# Patient Record
Sex: FEMALE
[De-identification: names, ages, dates, MRNs, and addresses within clinical notes are randomized per-mention and may not be internally consistent; named-entity substitution may affect disease eponyms.]

---

## 2018-03-22 ENCOUNTER — HOSPITAL ENCOUNTER (OUTPATIENT)
Dept: HOSPITAL 5 - OR | Age: 68
Discharge: HOME | End: 2018-03-22
Attending: OPHTHALMOLOGY
Payer: MEDICARE

## 2018-03-22 VITALS — SYSTOLIC BLOOD PRESSURE: 124 MMHG | DIASTOLIC BLOOD PRESSURE: 61 MMHG

## 2018-03-22 DIAGNOSIS — H26.9: Primary | ICD-10-CM

## 2018-03-22 DIAGNOSIS — J45.909: ICD-10-CM

## 2018-03-22 DIAGNOSIS — Z79.899: ICD-10-CM

## 2018-03-22 DIAGNOSIS — Z91.013: ICD-10-CM

## 2018-03-22 DIAGNOSIS — I10: ICD-10-CM

## 2018-03-22 DIAGNOSIS — Z88.0: ICD-10-CM

## 2018-03-22 PROCEDURE — 66984 XCAPSL CTRC RMVL W/O ECP: CPT

## 2018-03-22 PROCEDURE — V2632 POST CHMBR INTRAOCULAR LENS: HCPCS

## 2018-03-22 RX ADMIN — PHENYLEPHRINE HYDROCHLORIDE SCH DROPS: 25 SOLUTION/ DROPS OPHTHALMIC at 08:20

## 2018-03-22 RX ADMIN — PHENYLEPHRINE HYDROCHLORIDE SCH DROPS: 25 SOLUTION/ DROPS OPHTHALMIC at 07:56

## 2018-03-22 RX ADMIN — TROPICAMIDE SCH DROPS: 10 SOLUTION/ DROPS OPHTHALMIC at 08:01

## 2018-03-22 RX ADMIN — MOXIFLOXACIN SCH DROPS: 5 SOLUTION/ DROPS OPHTHALMIC at 08:01

## 2018-03-22 RX ADMIN — PHENYLEPHRINE HYDROCHLORIDE SCH DROPS: 25 SOLUTION/ DROPS OPHTHALMIC at 08:01

## 2018-03-22 RX ADMIN — TROPICAMIDE SCH DROPS: 10 SOLUTION/ DROPS OPHTHALMIC at 08:20

## 2018-03-22 RX ADMIN — TROPICAMIDE SCH DROPS: 10 SOLUTION/ DROPS OPHTHALMIC at 07:56

## 2018-03-22 RX ADMIN — MOXIFLOXACIN SCH DROPS: 5 SOLUTION/ DROPS OPHTHALMIC at 08:20

## 2018-03-22 RX ADMIN — MOXIFLOXACIN SCH DROPS: 5 SOLUTION/ DROPS OPHTHALMIC at 07:56

## 2018-03-22 NOTE — SHORT STAY SUMMARY
Short Stay Documentation


Date of service: 03/22/18





- History


H&P: obtained from office





- Allergies and Medications


Current Medications: 


 Allergies





Penicillins Allergy (Verified 03/21/18 13:16)


 Rash


Sulfa (Sulfonamide Antibiotics) Allergy (Verified 03/21/18 13:16)


 Itching





 Home Medications











 Medication  Instructions  Recorded  Confirmed  Last Taken  Type


 


ALBUTEROL Inhaler [Proair] 2 puff IH QID PRN 03/21/18 03/21/18 03/22/18 05:15 

History


 


Carisoprodol [Soma] 350 mg PO DAILY 03/21/18 03/21/18 03/21/18 History


 


Cholecalciferol (Vitamin D3) 2,000 unit PO QDAY 03/21/18 03/21/18 03/21/18 

History





[Vitamin D3 2,000 unit]     


 


Omeg3/Dha/Epa/Fish Oil/L.casei 1 each PO DAILY 03/21/18 03/21/18 03/21/18 

History





[Restora Capsule]     


 


Pantoprazole [Protonix] 40 mg PO QDAY 03/21/18 03/21/18 03/22/18 05:15 History


 


Verapamil HCl [Verapamil ER] 180 mg PO DAILY 03/21/18 03/21/18 03/22/18 05:15 

History


 


guaiFENesin/DEXTROMETHORPHAN 1 tab PO BID 03/21/18 03/21/18 03/22/18 05:15 

History





[Mucinex DM -30 mg TAB]     








Active Medications





Moxifloxacin HCl (Vigamox)  1 drops OS Q5MIN [facility]


   Stop: 03/22/18 15:00


   Last Admin: 03/22/18 08:20 Dose:  1 drops


Phenylephrine HCl (Ak-Dilate)  1 drops OS Q5MIN [facility]


   Stop: 03/22/18 15:00


   Last Admin: 03/22/18 08:20 Dose:  1 drops


Prednisolone Acetate (Pred Forte 1%)  1 drops OS QID [facility]


Tetracaine HCl (Tetracaine 0.5%)  1 drops OS Q5M PRN


   PRN Reason: Pain


   Stop: 03/22/18 15:00


   Last Admin: 03/22/18 07:55 Dose:  1 drops


Tropicamide (Mydriacyl)  1 drops OS Q5MIN [facility]


   Stop: 03/22/18 15:00


   Last Admin: 03/22/18 08:20 Dose:  1 drops











- Brief post op/procedure progress note


Date of procedure: 03/22/18


Pre-op diagnosis: left cataract


Post-op diagnosis: same


Procedure: 





Phacoemulsification with intraocular lens insertion left eye


Anesthesia: MAC, local


Surgeon: AMANDEEP BARAHONA


Estimated blood loss: none


Specimen disposition: to lab


Condition: stable





- Disposition


Condition at discharge: Good


Disposition: DC-01 TO HOME OR SELFCARE





- Discharge Diagnoses


(1) Cataract


Status: Acute   


Qualifiers: 


   Cataract type: age-related   Age-related cataract type: nuclear   Laterality

: left   Qualified Code(s): H25.12 - Age-related nuclear cataract, left eye   





Short Stay Discharge Plan


Additional Instructions: 


FOLLOW SURGEON INSTRUCTION SHEETS.


Follow up with: 


KIRBY CRUZ JR, MD [Primary Care Provider] - 7 Days


Forms:  [facility]

## 2018-03-22 NOTE — ANESTHESIA CONSULTATION
Anesthesia Consult and Med Hx


Date of service: 03/22/18





- Airway


Anesthetic Teeth Evaluation: Poor (missing teeth in the bottom), Dentures (upper

)


ROM Head & Neck: Adequate


Mental/Hyoid Distance: Adequate


Mallampati Class: Class II


Intubation Access Assessment: Probably Good





- Pre-Operative Health Status


ASA Pre-Surgery Classification: ASA3


Proposed Anesthetic Plan: MAC





- Pulmonary


Hx Asthma: Yes (uses inhaler daily)





- Cardiovascular System


Hx Hypertension: Yes (1998)





- Central Nervous System


Hx Psychiatric Problems: No





- Other Systems


Hx Alcohol Use: No


Hx Substance Use: No


Hx Cancer: No

## 2018-03-22 NOTE — OPERATIVE REPORT
Operative Report


Operative Report: 








   














PATIENT'S NAME:   





DATE OF BIRTH:   





DATE OF SURGERY: 03/22/2018   





PREOPERATIVE DIAGNOSIS:   Cataract left eye





   


POSTOPERATIVE DIAGNOSIS:    Same 





OPERATIVE PROCEDURE:   Phacoemulsification with intraocular lens implantation, 

left eye





SURGEON:   Tish Esteban M.D.





ASSISTANT SURGEON: None   





Lens:  zlboo 18.0 D


 


ANESTHESIA:   Monitored anesthesia care in combination with topical and 

intracameral anesthesia because of the established specific risk of reflux, 

arrhythmias, or anxiety attacks associated with ocular manipulation, as well as 

the difficulty of the ophthalmologist to manage such potentially catastrophic 

events while simultaneously attempting to complete the surgical procedure and 

was deemed necessary for the patient's safety to have an Anesthetist present 

during the procedure whenever possible. An Anesthetist was utilized to regulate 

the intravenous sedation of the patient so the patient was cooperative yet not 

asleep in order for the patient to successfully maintain fixation of the eye on 

the operating light of the microscope.





COMPLICATIONS:   No blood loss.





ALLERGIES:   PCN sulfa





PROGNOSIS:  Excellent





INDICATIONS FOR SURGERY:  The patient is undergoing surgery in the hopes of 

eliminating or improving these visual difficulties.





PROCEDURE:    After arriving at the surgery center, the patient was given 

topical anesthetic and dilating drops, as noted in the record. The patient was 

then taken into the operating room and given more anesthetic drops.  The eyelids

, lashes, and lid margins were scrubbed with Betadine solution, and the patient 

was draped.  The Nurse Anesthetist administered IV sedation and monitored the 

patient during the procedure.





The eye was then fixated with a 0.12, and a stab incision was made in the 

peripheral clear cornea into the anterior chamber. This was made on my left 

side.  Viscoelastic was next used to fill the anterior chamber.  The eye was 

once again fixated with the 0.12 forceps and a keratome was used make an 

incision in clear cornea peripherally on my right hand side temporally.





The capsule forceps were used to open the central anterior capsule and then 

make a continuous round capsulotomy.


Hydrodissection was carried out utilizing a cannula and balanced salt solution 

to delineate the cortical material from the capsule and the nucleus from the 

cortical material.


The phaco tip was introduced into the eye and used to remove the anterior 

cortical material in the area of the capsulotomy.  Then the phaco tip was 

buried into the nucleus, and a chopping instrument was introduced into the eye 

and used to provide countertraction in the nucleus between this instrument and 

the phaco tip fracturing the nucleus.  This procedure was repeated multiple 

times, providing multiple small segments of the lens, and then the phaco tip 

was used to remove each of these segments.


An I/A tip was then used to remove the remaining cortex.  


The anterior chamber was refilled with viscoelastic.  An one-piece, acrylic 

intraocular lens was then placed into an inserting cartridge.  The tip of the 

inserting cartridge was introduced into the keratome incision and into the 

anterior chamber.  The implant was gently advanced through the cartridge and 

into the eye, where it unfolded, and both haptics were placed in the capsular 

bag, where it centered nicely and appeared to be well fixated.  





After placement of the intraocular lens, the I~and~A handpiece was placed back 

into the eye and used to remove the viscoelastic, including viscoelastic that 

was behind the optic of the intraocular lens.  The anterior chamber was then 

filled with balanced salt solution, and hydration of the wound was used to 

cause swelling of the wound and more appropriate watertight closure.  When the 

wound was found to be firm, the patient was asked to comment on how bright the 

light was.  If there was no light perception at all or if the light was 

substantially dimmer than during the rest of the surgery, the amount of fluid 

in the eye was decompressed to lower the intraocular pressure until the patient 

could see the bright light again.  This was done to avoid any damage or 

decreased blood flow to the optic nerve.











MEDICATIONS APPLIED AT END OF SURGERY:  One drop of Pred Forte and Vigamox





The patient was given a shield to wear at night and was instructed not to rub 

or push on the eye.





DISCHARGE SUMMARY:  The patient was released in stable condition.  The patient 

and those with the patient were given a written sheet of postoperative 

instructions and counseling on any abnormal laboratory studies.  The patient is 

to see us tomorrow for follow-up in the office and is to call immediately for 

any difficulties.  











_____________________________          ________________________________


Tish Esteban M.D.                            Date

## 2018-04-12 ENCOUNTER — HOSPITAL ENCOUNTER (OUTPATIENT)
Dept: HOSPITAL 5 - OR | Age: 68
Discharge: HOME | End: 2018-04-12
Attending: OPHTHALMOLOGY
Payer: MEDICARE

## 2018-04-12 VITALS — SYSTOLIC BLOOD PRESSURE: 124 MMHG | DIASTOLIC BLOOD PRESSURE: 72 MMHG

## 2018-04-12 DIAGNOSIS — J45.909: ICD-10-CM

## 2018-04-12 DIAGNOSIS — Z88.0: ICD-10-CM

## 2018-04-12 DIAGNOSIS — Z79.01: ICD-10-CM

## 2018-04-12 DIAGNOSIS — H26.9: Primary | ICD-10-CM

## 2018-04-12 DIAGNOSIS — Z79.899: ICD-10-CM

## 2018-04-12 DIAGNOSIS — I10: ICD-10-CM

## 2018-04-12 DIAGNOSIS — Z88.2: ICD-10-CM

## 2018-04-12 PROCEDURE — 66984 XCAPSL CTRC RMVL W/O ECP: CPT

## 2018-04-12 PROCEDURE — V2632 POST CHMBR INTRAOCULAR LENS: HCPCS

## 2018-04-12 RX ADMIN — PHENYLEPHRINE HYDROCHLORIDE SCH DROPS: 25 SOLUTION/ DROPS OPHTHALMIC at 12:30

## 2018-04-12 RX ADMIN — PHENYLEPHRINE HYDROCHLORIDE SCH DROPS: 25 SOLUTION/ DROPS OPHTHALMIC at 12:20

## 2018-04-12 RX ADMIN — MOXIFLOXACIN SCH DROPS: 5 SOLUTION/ DROPS OPHTHALMIC at 12:20

## 2018-04-12 RX ADMIN — PHENYLEPHRINE HYDROCHLORIDE SCH DROPS: 25 SOLUTION/ DROPS OPHTHALMIC at 12:25

## 2018-04-12 RX ADMIN — TROPICAMIDE SCH DROPS: 10 SOLUTION/ DROPS OPHTHALMIC at 12:25

## 2018-04-12 RX ADMIN — TROPICAMIDE SCH DROPS: 10 SOLUTION/ DROPS OPHTHALMIC at 12:30

## 2018-04-12 RX ADMIN — TROPICAMIDE SCH DROPS: 10 SOLUTION/ DROPS OPHTHALMIC at 12:20

## 2018-04-12 RX ADMIN — MOXIFLOXACIN SCH DROPS: 5 SOLUTION/ DROPS OPHTHALMIC at 12:30

## 2018-04-12 RX ADMIN — MOXIFLOXACIN SCH DROPS: 5 SOLUTION/ DROPS OPHTHALMIC at 12:25

## 2018-04-12 NOTE — OPERATIVE REPORT
Operative Report


Operative Report: 








   














PATIENT'S NAME:   





DATE OF BIRTH:   





DATE OF SURGERY: 04/12/2018   





PREOPERATIVE DIAGNOSIS:   Cataract right eye





   


POSTOPERATIVE DIAGNOSIS:    Same 





OPERATIVE PROCEDURE:   Phacoemulsification with intraocular lens implantation, 

right eye





SURGEON:   Tish Esteban M.D.





ASSISTANT SURGEON: None   





Lens:  ZLBOO 18.0 D


 


ANESTHESIA:   Monitored anesthesia care in combination with topical and 

intracameral anesthesia because of the established specific risk of reflux, 

arrhythmias, or anxiety attacks associated with ocular manipulation, as well as 

the difficulty of the ophthalmologist to manage such potentially catastrophic 

events while simultaneously attempting to complete the surgical procedure and 

was deemed necessary for the patient's safety to have an Anesthetist present 

during the procedure whenever possible. An Anesthetist was utilized to regulate 

the intravenous sedation of the patient so the patient was cooperative yet not 

asleep in order for the patient to successfully maintain fixation of the eye on 

the operating light of the microscope.





COMPLICATIONS:   o surgical complications No blood loss.





ALLERGIES:   Penicillin sulfa





PROGNOSIS:  Excellent





INDICATIONS FOR SURGERY:  The patient is undergoing surgery in the hopes of 

eliminating or improving these visual difficulties.





PROCEDURE:    After arriving at the surgery center, the patient was given 

topical anesthetic and dilating drops, as noted in the record. The patient was 

then taken into the operating room and given more anesthetic drops.  The eyelids

, lashes, and lid margins were scrubbed with Betadine solution, and the patient 

was draped.  The Nurse Anesthetist administered IV sedation and monitored the 

patient during the procedure.





The eye was then fixated with a 0.12, and a stab incision was made in the 

peripheral clear cornea into the anterior chamber. This was made on my left 

side.  Viscoelastic was next used to fill the anterior chamber.  The eye was 

once again fixated with the 0.12 forceps and a keratome was used make an 

incision in clear cornea peripherally on my right hand side temporally.





The capsule forceps were used to open the central anterior capsule and then 

make a continuous round capsulotomy.


Hydrodissection was carried out utilizing a cannula and balanced salt solution 

to delineate the cortical material from the capsule and the nucleus from the 

cortical material.


The phaco tip was introduced into the eye and used to remove the anterior 

cortical material in the area of the capsulotomy.  Then the phaco tip was 

buried into the nucleus, and a chopping instrument was introduced into the eye 

and used to provide countertraction in the nucleus between this instrument and 

the phaco tip fracturing the nucleus.  This procedure was repeated multiple 

times, providing multiple small segments of the lens, and then the phaco tip 

was used to remove each of these segments.


An I/A tip was then used to remove the remaining cortex.  


The anterior chamber was refilled with viscoelastic.  An one-piece, acrylic 

intraocular lens was then placed into an inserting cartridge.  The tip of the 

inserting cartridge was introduced into the keratome incision and into the 

anterior chamber.  The implant was gently advanced through the cartridge and 

into the eye, where it unfolded, and both haptics were placed in the capsular 

bag, where it centered nicely and appeared to be well fixated.  





After placement of the intraocular lens, the I~and~A handpiece was placed back 

into the eye and used to remove the viscoelastic, including viscoelastic that 

was behind the optic of the intraocular lens.  The anterior chamber was then 

filled with balanced salt solution, and hydration of the wound was used to 

cause swelling of the wound and more appropriate watertight closure.  When the 

wound was found to be firm, the patient was asked to comment on how bright the 

light was.  If there was no light perception at all or if the light was 

substantially dimmer than during the rest of the surgery, the amount of fluid 

in the eye was decompressed to lower the intraocular pressure until the patient 

could see the bright light again.  This was done to avoid any damage or 

decreased blood flow to the optic nerve.











MEDICATIONS APPLIED AT END OF SURGERY:  One drop of Pred Forte and Vigamox





The patient was given a shield to wear at night and was instructed not to rub 

or push on the eye.





DISCHARGE SUMMARY:  The patient was released in stable condition.  The patient 

and those with the patient were given a written sheet of postoperative 

instructions and counseling on any abnormal laboratory studies.  The patient is 

to see us tomorrow for follow-up in the office and is to call immediately for 

any difficulties.  











_____________________________          ________________________________


Tish Esteban M.D.                            Date

## 2018-04-12 NOTE — SHORT STAY SUMMARY
Short Stay Documentation


Date of service: 04/12/18





- History


H&P: obtained from office





- Allergies and Medications


Current Medications: 


 Allergies





Penicillins Allergy (Verified 03/30/18 10:18)


 Rash


Sulfa (Sulfonamide Antibiotics) Allergy (Verified 03/30/18 10:18)


 Itching





 Home Medications











 Medication  Instructions  Recorded  Confirmed  Last Taken  Type


 


ALBUTEROL Inhaler [Proair] 2 puff IH QID PRN 03/21/18 04/12/18 04/12/18 07:00 

History


 


Carisoprodol [Soma] 350 mg PO DAILY 03/21/18 04/12/18 04/11/18 History


 


Cholecalciferol (Vitamin D3) 2,000 unit PO QDAY 03/21/18 04/12/18 04/11/18 

History





[Vitamin D3 2,000 unit]     


 


Omeg3/Dha/Epa/Fish Oil/L.casei 1 each PO DAILY 03/21/18 04/12/18 04/11/18 

History





[Restora Capsule]     


 


Pantoprazole [Protonix] 40 mg PO QDAY 03/21/18 04/12/18 04/12/18 07:00 History


 


Verapamil HCl [Verapamil ER] 180 mg PO DAILY 03/21/18 04/12/18 04/12/18 07:00 

History


 


guaiFENesin/DEXTROMETHORPHAN 1 tab PO BID 03/21/18 04/12/18 04/11/18 History





[Mucinex DM -30 mg TAB]     








Active Medications





Hydromorphone HCl (Dilaudid)  0.25 mg IV Q10MIN PRN


   PRN Reason: Pain, Moderate (4-6)


Moxifloxacin HCl (Vigamox)  1 drops OD Q5MIN [facility]


   Stop: 04/14/18 06:01


   Last Admin: 04/12/18 12:30 Dose:  1 drops


Naloxone HCl (Narcan 0.4 Mg/1 Ml)  0.1 mg IV Q2MIN PRN


   PRN Reason: Res Rate </= 8 or 02 SAT < 92%


Ondansetron HCl (Zofran)  4 mg IV ONCE PRN


   PRN Reason: Nausea And Vomiting


Phenylephrine HCl (Ak-Dilate)  1 drops OD Q5MIN [facility]


   Stop: 04/14/18 06:01


   Last Admin: 04/12/18 12:30 Dose:  1 drops


Prednisolone Acetate (Pred Forte 1%)  1 drops OD QID WEND[facility]


Tetracaine HCl (Tetracaine 0.5%)  1 drops OD Q5M PRN


   PRN Reason: Analgesia


   Last Admin: 04/12/18 12:18 Dose:  1 drops


Tropicamide (Mydriacyl)  1 drops OD Q5MIN [facility]


   Stop: 04/14/18 06:01


   Last Admin: 04/12/18 12:30 Dose:  1 drops











- Brief post op/procedure progress note


Date of procedure: 04/12/18


Pre-op diagnosis: right cataract


Post-op diagnosis: same


Procedure: 





Phacoemulsification with intraocular lens insertion right eye


Anesthesia: MAC, local


Surgeon: AMANDEEP BARAHONA


Pathology: none


Condition: stable





- Disposition


Condition at discharge: Good


Disposition: DC-01 TO HOME OR SELFCARE





- Discharge Diagnoses


(1) Cataract


Status: Acute   


Qualifiers: 


   Cataract type: age-related   Age-related cataract type: nuclear   Laterality

: right   Qualified Code(s): H25.11 - Age-related nuclear cataract, right eye   





Short Stay Discharge Plan


Follow up with: 


KIRBY CRUZ JR, MD [Primary Care Provider] - 7 Days

## 2018-04-12 NOTE — ANESTHESIA CONSULTATION
Anesthesia Consult and Med Hx


Date of service: 04/12/18





- Airway


Anesthetic Teeth Evaluation: Poor (missing most teeth.  no upper teeth.  few 

lower teeth are chipped, eroded and miscolored), Chipped


ROM Head & Neck: Adequate


Mental/Hyoid Distance: Adequate


Mallampati Class: Class III


Intubation Access Assessment: Probably Good





- Pulmonary Exam


CTA: Yes





- Cardiac Exam


Cardiac Exam: RRR





- Pre-Operative Health Status


ASA Pre-Surgery Classification: ASA2





- Pulmonary


Hx Asthma: Yes





- Cardiovascular System


Hx Hypertension: Yes (SINCE 98')





- Central Nervous System


Hx Back Pain: Yes


Hx Psychiatric Problems: No





- Other Systems


Hx Alcohol Use: No


Hx Substance Use: No


Hx Cancer: No

## 2022-05-30 ENCOUNTER — OUT OF OFFICE VISIT (OUTPATIENT)
Dept: URBAN - METROPOLITAN AREA MEDICAL CENTER 16 | Facility: MEDICAL CENTER | Age: 72
End: 2022-05-30
Payer: MEDICARE

## 2022-05-30 DIAGNOSIS — R07.89 ACUTE CHEST WALL PAIN: ICD-10-CM

## 2022-05-30 DIAGNOSIS — R10.13 ABDOMINAL DISCOMFORT, EPIGASTRIC: ICD-10-CM

## 2022-05-30 PROCEDURE — 99222 1ST HOSP IP/OBS MODERATE 55: CPT | Performed by: INTERNAL MEDICINE

## 2022-05-30 PROCEDURE — 99232 SBSQ HOSP IP/OBS MODERATE 35: CPT | Performed by: INTERNAL MEDICINE

## 2022-05-30 PROCEDURE — G8427 DOCREV CUR MEDS BY ELIG CLIN: HCPCS | Performed by: INTERNAL MEDICINE

## 2023-12-02 ENCOUNTER — CLAIMS CREATED FROM THE CLAIM WINDOW (OUTPATIENT)
Dept: URBAN - METROPOLITAN AREA MEDICAL CENTER 16 | Facility: MEDICAL CENTER | Age: 73
End: 2023-12-02
Payer: MEDICARE

## 2023-12-02 DIAGNOSIS — R10.84 ABDOMINAL CRAMPING, GENERALIZED: ICD-10-CM

## 2023-12-02 DIAGNOSIS — R93.3 ABN FINDINGS-GI TRACT: ICD-10-CM

## 2023-12-02 DIAGNOSIS — R11.2 ACUTE NAUSEA WITH NONBILIOUS VOMITING: ICD-10-CM

## 2023-12-02 PROCEDURE — 99222 1ST HOSP IP/OBS MODERATE 55: CPT | Performed by: INTERNAL MEDICINE

## 2023-12-02 PROCEDURE — G8427 DOCREV CUR MEDS BY ELIG CLIN: HCPCS | Performed by: INTERNAL MEDICINE

## 2023-12-02 PROCEDURE — 99254 IP/OBS CNSLTJ NEW/EST MOD 60: CPT | Performed by: INTERNAL MEDICINE

## 2023-12-03 ENCOUNTER — CLAIMS CREATED FROM THE CLAIM WINDOW (OUTPATIENT)
Dept: URBAN - METROPOLITAN AREA MEDICAL CENTER 16 | Facility: MEDICAL CENTER | Age: 73
End: 2023-12-03
Payer: MEDICARE

## 2023-12-03 DIAGNOSIS — D72.829 ELEVATED WHITE BLOOD CELL COUNT: ICD-10-CM

## 2023-12-03 DIAGNOSIS — R10.84 ABDOMINAL CRAMPING, GENERALIZED: ICD-10-CM

## 2023-12-03 DIAGNOSIS — R93.3 ABN FINDINGS-GI TRACT: ICD-10-CM

## 2023-12-03 PROCEDURE — 99233 SBSQ HOSP IP/OBS HIGH 50: CPT | Performed by: INTERNAL MEDICINE

## 2023-12-04 ENCOUNTER — CLAIMS CREATED FROM THE CLAIM WINDOW (OUTPATIENT)
Dept: URBAN - METROPOLITAN AREA MEDICAL CENTER 16 | Facility: MEDICAL CENTER | Age: 73
End: 2023-12-04
Payer: MEDICARE

## 2023-12-04 DIAGNOSIS — K56.609 SMALL BOWEL OBSTRUCTION: ICD-10-CM

## 2023-12-04 DIAGNOSIS — R10.84 ABDOMINAL CRAMPING, GENERALIZED: ICD-10-CM

## 2023-12-04 DIAGNOSIS — R93.3 ABN FINDINGS-GI TRACT: ICD-10-CM

## 2023-12-04 DIAGNOSIS — R11.2 ACUTE NAUSEA WITH NONBILIOUS VOMITING: ICD-10-CM

## 2023-12-04 PROCEDURE — 99232 SBSQ HOSP IP/OBS MODERATE 35: CPT
